# Patient Record
Sex: MALE | Race: BLACK OR AFRICAN AMERICAN | NOT HISPANIC OR LATINO | URBAN - METROPOLITAN AREA
[De-identification: names, ages, dates, MRNs, and addresses within clinical notes are randomized per-mention and may not be internally consistent; named-entity substitution may affect disease eponyms.]

---

## 2024-01-15 ENCOUNTER — OFFICE VISIT (OUTPATIENT)
Dept: PRIMARY CARE | Facility: CLINIC | Age: 34
End: 2024-01-15
Payer: COMMERCIAL

## 2024-01-15 ENCOUNTER — ANCILLARY PROCEDURE (OUTPATIENT)
Dept: RADIOLOGY | Facility: CLINIC | Age: 34
End: 2024-01-15
Payer: COMMERCIAL

## 2024-01-15 VITALS
TEMPERATURE: 98.3 F | SYSTOLIC BLOOD PRESSURE: 120 MMHG | OXYGEN SATURATION: 97 % | DIASTOLIC BLOOD PRESSURE: 78 MMHG | HEART RATE: 92 BPM

## 2024-01-15 DIAGNOSIS — R05.2 SUBACUTE COUGH: ICD-10-CM

## 2024-01-15 DIAGNOSIS — R06.02 SHORTNESS OF BREATH: ICD-10-CM

## 2024-01-15 DIAGNOSIS — R05.2 SUBACUTE COUGH: Primary | ICD-10-CM

## 2024-01-15 DIAGNOSIS — J06.9 ACUTE URI: ICD-10-CM

## 2024-01-15 PROCEDURE — 99214 OFFICE O/P EST MOD 30 MIN: CPT | Performed by: STUDENT IN AN ORGANIZED HEALTH CARE EDUCATION/TRAINING PROGRAM

## 2024-01-15 PROCEDURE — 1036F TOBACCO NON-USER: CPT | Performed by: STUDENT IN AN ORGANIZED HEALTH CARE EDUCATION/TRAINING PROGRAM

## 2024-01-15 PROCEDURE — 71046 X-RAY EXAM CHEST 2 VIEWS: CPT

## 2024-01-15 PROCEDURE — 71046 X-RAY EXAM CHEST 2 VIEWS: CPT | Performed by: RADIOLOGY

## 2024-01-15 RX ORDER — METHYLPREDNISOLONE 4 MG/1
4 TABLET ORAL
COMMUNITY
Start: 2024-01-06 | End: 2024-01-15 | Stop reason: ALTCHOICE

## 2024-01-15 RX ORDER — AZITHROMYCIN 250 MG/1
TABLET, FILM COATED ORAL
Qty: 6 TABLET | Refills: 0 | Status: SHIPPED | OUTPATIENT
Start: 2024-01-15

## 2024-01-15 RX ORDER — ALBUTEROL SULFATE 90 UG/1
2 AEROSOL, METERED RESPIRATORY (INHALATION) EVERY 4 HOURS PRN
Qty: 18 G | Refills: 1 | Status: SHIPPED | OUTPATIENT
Start: 2024-01-15 | End: 2025-01-14

## 2024-01-15 RX ORDER — HYDROXYZINE PAMOATE 25 MG/1
CAPSULE ORAL
COMMUNITY
Start: 2023-12-08

## 2024-01-15 RX ORDER — METHYLPREDNISOLONE 4 MG/1
TABLET ORAL
Qty: 21 TABLET | Refills: 0 | Status: SHIPPED | OUTPATIENT
Start: 2024-01-15 | End: 2024-01-22

## 2024-01-15 RX ORDER — BENZONATATE 200 MG/1
200 CAPSULE ORAL 3 TIMES DAILY PRN
Qty: 42 CAPSULE | Refills: 1 | Status: SHIPPED | OUTPATIENT
Start: 2024-01-15 | End: 2024-07-13

## 2024-01-15 RX ORDER — ALBUTEROL SULFATE 0.83 MG/ML
2.5 SOLUTION RESPIRATORY (INHALATION) EVERY 4 HOURS PRN
Qty: 3 ML | Refills: 1 | Status: SHIPPED | OUTPATIENT
Start: 2024-01-15 | End: 2024-01-15

## 2024-01-15 RX ORDER — AMOXICILLIN AND CLAVULANATE POTASSIUM 875; 125 MG/1; MG/1
1 TABLET, FILM COATED ORAL 2 TIMES DAILY
COMMUNITY

## 2024-01-15 ASSESSMENT — PATIENT HEALTH QUESTIONNAIRE - PHQ9
1. LITTLE INTEREST OR PLEASURE IN DOING THINGS: NOT AT ALL
SUM OF ALL RESPONSES TO PHQ9 QUESTIONS 1 AND 2: 0
2. FEELING DOWN, DEPRESSED OR HOPELESS: NOT AT ALL

## 2024-01-15 NOTE — PROGRESS NOTES
Subjective   Patient ID: Benjy Ly is a 33 y.o. male who presents for Cough.    HPI     Patient was originally sick mid December, where he had a sore throat with congestion.  He then improved slightly, but got another sore throat, on and off elevated temperatures, congestion and cough.  At that time, patient went to a [facility] on 1/6/2024 presenting with cough and sinus drainage, and lots of congestion.  He was given a Medrol Dosepak and Augmentin for 7 days.  Tested negative for RSV/COVID/flu.    Today patient is still complaining of cough states his other symptoms have resolved but he is still having a cough and shortness of breath when working out.  Does admit to having asthma as a kid.  Patient states he is worried of developing a pneumonia.  Patient has been trying over-the-counter Mucinex for chest congestion which has been helping slightly.  At this time patient denies any fevers, chills, ear pain, sinus pressure but admits to shortness of breath and cough.      Review of Systems  See above    Objective   /78 (BP Location: Left arm, Patient Position: Sitting, BP Cuff Size: Adult)   Pulse 92   Temp 36.8 °C (98.3 °F)   SpO2 97%     Physical Exam  Constitutional:       General: He is not in acute distress.     Appearance: Normal appearance. He is normal weight. He is not ill-appearing.   HENT:      Head: Normocephalic and atraumatic.      Right Ear: External ear normal.      Left Ear: External ear normal.      Nose: Nose normal.      Mouth/Throat:      Mouth: Mucous membranes are moist.      Pharynx: Oropharynx is clear.   Eyes:      Extraocular Movements: Extraocular movements intact.      Conjunctiva/sclera: Conjunctivae normal.   Cardiovascular:      Rate and Rhythm: Normal rate and regular rhythm.      Heart sounds: Normal heart sounds.   Pulmonary:      Breath sounds: Wheezing, rhonchi and rales present.   Skin:     General: Skin is warm.   Neurological:       General: No focal deficit present.      Mental Status: He is alert.   Psychiatric:         Mood and Affect: Mood normal.         Assessment/Plan     Discussed treatment options and course of illness with patient.  Patient is a formal  Rashawn, who is generally healthy.  An order for a Medrol Dosepak and azithromycin have been placed for you to cover for any atypical bacteria coverage that the Augmentin did not cover.  In addition Tessalon Perles have been ordered for your cough.  I will also order albuterol to help you with your reactive airway, as well as exercise induced coughing.  I will also order a chest x-ray, for you to get done at your earliest convenience to rule out any pneumonias.    Risks, benefits, and options of treatment(s) were discussed after reviewing all current medication(s) and drug allergy(ies)    I opted for the treatment that we discussed with instructions on the medication use for your underlying medical ailment(s)    I encouraged supportive care such as rest, fluids and Advil/Tylenol as warranted    Return to the clinic in 7-10 days or sooner if symptoms worsen or persist as we will then further evaluate

## 2024-01-16 NOTE — RESULT ENCOUNTER NOTE
Chest x-ray within normal limits    No pneumonia is noted    I would recommend continuing the therapy as discussed yesterday

## 2024-08-27 ENCOUNTER — LAB (OUTPATIENT)
Dept: LAB | Facility: LAB | Age: 34
End: 2024-08-27
Payer: COMMERCIAL

## 2024-08-27 ENCOUNTER — APPOINTMENT (OUTPATIENT)
Dept: PRIMARY CARE | Facility: CLINIC | Age: 34
End: 2024-08-27
Payer: COMMERCIAL

## 2024-08-27 VITALS
HEART RATE: 70 BPM | WEIGHT: 190 LBS | DIASTOLIC BLOOD PRESSURE: 72 MMHG | SYSTOLIC BLOOD PRESSURE: 112 MMHG | BODY MASS INDEX: 29.82 KG/M2 | HEIGHT: 67 IN

## 2024-08-27 DIAGNOSIS — R53.83 OTHER FATIGUE: ICD-10-CM

## 2024-08-27 DIAGNOSIS — Z00.00 HEALTHCARE MAINTENANCE: Primary | ICD-10-CM

## 2024-08-27 DIAGNOSIS — Z11.1 SCREENING-PULMONARY TB: ICD-10-CM

## 2024-08-27 DIAGNOSIS — Z00.00 HEALTHCARE MAINTENANCE: ICD-10-CM

## 2024-08-27 LAB
25(OH)D3 SERPL-MCNC: 19 NG/ML (ref 30–100)
ALBUMIN SERPL BCP-MCNC: 4.6 G/DL (ref 3.4–5)
ALP SERPL-CCNC: 50 U/L (ref 33–120)
ALT SERPL W P-5'-P-CCNC: 21 U/L (ref 10–52)
ANION GAP SERPL CALC-SCNC: 12 MMOL/L (ref 10–20)
AST SERPL W P-5'-P-CCNC: 23 U/L (ref 9–39)
BASOPHILS # BLD AUTO: 0.06 X10*3/UL (ref 0–0.1)
BASOPHILS NFR BLD AUTO: 1.6 %
BILIRUB SERPL-MCNC: 0.7 MG/DL (ref 0–1.2)
BUN SERPL-MCNC: 12 MG/DL (ref 6–23)
CALCIUM SERPL-MCNC: 10.1 MG/DL (ref 8.6–10.3)
CHLORIDE SERPL-SCNC: 104 MMOL/L (ref 98–107)
CHOLEST SERPL-MCNC: 242 MG/DL (ref 0–199)
CHOLESTEROL/HDL RATIO: 5.1
CO2 SERPL-SCNC: 25 MMOL/L (ref 21–32)
CREAT SERPL-MCNC: 1.15 MG/DL (ref 0.5–1.3)
EGFRCR SERPLBLD CKD-EPI 2021: 85 ML/MIN/1.73M*2
EOSINOPHIL # BLD AUTO: 0.19 X10*3/UL (ref 0–0.7)
EOSINOPHIL NFR BLD AUTO: 5.1 %
ERYTHROCYTE [DISTWIDTH] IN BLOOD BY AUTOMATED COUNT: 13.8 % (ref 11.5–14.5)
GLUCOSE SERPL-MCNC: 101 MG/DL (ref 74–99)
HCT VFR BLD AUTO: 44.9 % (ref 41–52)
HCV AB SER QL: NONREACTIVE
HDLC SERPL-MCNC: 47 MG/DL
HGB BLD-MCNC: 15.4 G/DL (ref 13.5–17.5)
HIV 1+2 AB+HIV1 P24 AG SERPL QL IA: NONREACTIVE
IMM GRANULOCYTES # BLD AUTO: 0.02 X10*3/UL (ref 0–0.7)
IMM GRANULOCYTES NFR BLD AUTO: 0.5 % (ref 0–0.9)
LDLC SERPL CALC-MCNC: 165 MG/DL
LYMPHOCYTES # BLD AUTO: 1.64 X10*3/UL (ref 1.2–4.8)
LYMPHOCYTES NFR BLD AUTO: 43.6 %
MCH RBC QN AUTO: 31 PG (ref 26–34)
MCHC RBC AUTO-ENTMCNC: 34.3 G/DL (ref 32–36)
MCV RBC AUTO: 90 FL (ref 80–100)
MONOCYTES # BLD AUTO: 0.35 X10*3/UL (ref 0.1–1)
MONOCYTES NFR BLD AUTO: 9.3 %
NEUTROPHILS # BLD AUTO: 1.5 X10*3/UL (ref 1.2–7.7)
NEUTROPHILS NFR BLD AUTO: 39.9 %
NON HDL CHOLESTEROL: 195 MG/DL (ref 0–149)
NRBC BLD-RTO: 0.5 /100 WBCS (ref 0–0)
PLATELET # BLD AUTO: 223 X10*3/UL (ref 150–450)
POC APPEARANCE, URINE: CLEAR
POC BILIRUBIN, URINE: NEGATIVE
POC BLOOD, URINE: NEGATIVE
POC COLOR, URINE: YELLOW
POC GLUCOSE, URINE: NEGATIVE MG/DL
POC KETONES, URINE: NEGATIVE MG/DL
POC LEUKOCYTES, URINE: NEGATIVE
POC NITRITE,URINE: NEGATIVE
POC PH, URINE: 7 PH
POC PROTEIN, URINE: NEGATIVE MG/DL
POC SPECIFIC GRAVITY, URINE: 1.01
POC UROBILINOGEN, URINE: 1 EU/DL
POTASSIUM SERPL-SCNC: 4 MMOL/L (ref 3.5–5.3)
PROT SERPL-MCNC: 7.6 G/DL (ref 6.4–8.2)
RBC # BLD AUTO: 4.97 X10*6/UL (ref 4.5–5.9)
SODIUM SERPL-SCNC: 137 MMOL/L (ref 136–145)
TRIGL SERPL-MCNC: 148 MG/DL (ref 0–149)
TSH SERPL-ACNC: 0.99 MIU/L (ref 0.44–3.98)
VIT B12 SERPL-MCNC: 368 PG/ML (ref 211–911)
VLDL: 30 MG/DL (ref 0–40)
WBC # BLD AUTO: 3.8 X10*3/UL (ref 4.4–11.3)

## 2024-08-27 PROCEDURE — 86481 TB AG RESPONSE T-CELL SUSP: CPT

## 2024-08-27 PROCEDURE — 83036 HEMOGLOBIN GLYCOSYLATED A1C: CPT

## 2024-08-27 PROCEDURE — 36415 COLL VENOUS BLD VENIPUNCTURE: CPT

## 2024-08-27 PROCEDURE — 84443 ASSAY THYROID STIM HORMONE: CPT

## 2024-08-27 PROCEDURE — 85025 COMPLETE CBC W/AUTO DIFF WBC: CPT

## 2024-08-27 PROCEDURE — 93000 ELECTROCARDIOGRAM COMPLETE: CPT | Performed by: STUDENT IN AN ORGANIZED HEALTH CARE EDUCATION/TRAINING PROGRAM

## 2024-08-27 PROCEDURE — 82306 VITAMIN D 25 HYDROXY: CPT

## 2024-08-27 PROCEDURE — 80053 COMPREHEN METABOLIC PANEL: CPT

## 2024-08-27 PROCEDURE — 3008F BODY MASS INDEX DOCD: CPT | Performed by: STUDENT IN AN ORGANIZED HEALTH CARE EDUCATION/TRAINING PROGRAM

## 2024-08-27 PROCEDURE — 81002 URINALYSIS NONAUTO W/O SCOPE: CPT | Performed by: STUDENT IN AN ORGANIZED HEALTH CARE EDUCATION/TRAINING PROGRAM

## 2024-08-27 PROCEDURE — 87389 HIV-1 AG W/HIV-1&-2 AB AG IA: CPT

## 2024-08-27 PROCEDURE — 86803 HEPATITIS C AB TEST: CPT

## 2024-08-27 PROCEDURE — 82607 VITAMIN B-12: CPT

## 2024-08-27 PROCEDURE — 99395 PREV VISIT EST AGE 18-39: CPT | Performed by: STUDENT IN AN ORGANIZED HEALTH CARE EDUCATION/TRAINING PROGRAM

## 2024-08-27 PROCEDURE — 80061 LIPID PANEL: CPT

## 2024-08-27 NOTE — PROGRESS NOTES
Subjective   Patient ID: Benjy Ly is a 34 y.o. male who presents for Annual Exam.  Today he is accompanied by alone.     Currently is a  Rashawn coming into the office for his yearly physical examination    HPI  1.  Healthcare maintenance  Overall patient is doing well.   Immunization: Tdap 2019  Last influenza vaccine was 2022  COVID-19 vaccine up-to-date x 3  Colon Cancer Screening: No direct family history but possibly on his mother side  Diet: Attempts to eat a well-balanced diet  Exercise: Exercises regularly  Tobacco: Denies use  EtOH: Rarely/socially  He is fasting in preparation of lab work performed  Denies any chest pain, palpitations, shortness of breath, cough, nausea, vomiting, diarrhea, or any other acute signs/symptoms      Current Outpatient Medications on File Prior to Visit   Medication Sig Dispense Refill    [DISCONTINUED] albuterol (ProAir HFA) 90 mcg/actuation inhaler Inhale 2 puffs every 4 hours if needed for wheezing or shortness of breath. 18 g 1    [DISCONTINUED] amoxicillin-pot clavulanate (Augmentin) 875-125 mg tablet Take 1 tablet (875 mg) by mouth 2 times a day.      [DISCONTINUED] azithromycin (Zithromax Z-Jensen) 250 mg tablet TAKE 2 TABLETS ON DAY 1 THEN TAKE 1 TABLET A DAY FOR 4 DAYS 6 tablet 0    [DISCONTINUED] hydrOXYzine pamoate (Vistaril) 25 mg capsule TAKE 1 CAPSULE BY MOUTH EVERY 6 HOURS AS NEEDED FOR UP TO 7 DAYS.       No current facility-administered medications on file prior to visit.        No Known Allergies    Immunization History   Administered Date(s) Administered    Flu vaccine (IIV4), preservative free *Check age/dose* 10/29/2020, 12/12/2022    Hepatitis B vaccine, 19 yrs and under (RECOMBIVAX, ENGERIX) 03/19/1998, 11/18/1998    Hepatitis B vaccine, adult *Check Product/Dose* 08/19/2009, 09/17/2009, 03/19/2010    MMR vaccine, subcutaneous (MMR II) 11/18/1998    Pfizer Purple Cap SARS-CoV-2 03/27/2021, 04/17/2021, 11/21/2021    Td vaccine, age 7 years and  "older (TDVAX) 08/19/2009         Review of Systems  All pertinent positive symptoms are included in the history of present illness.  All other systems have been reviewed and are negative and noncontributory to this patient's current ailments.     Objective   /72   Pulse 70   Ht 1.702 m (5' 7\")   Wt 86.2 kg (190 lb)   BMI 29.76 kg/m²   BSA: 2.02 meters squared      Physical Exam  CONSTITUTIONAL - well nourished, well developed, looks like stated age, in no acute distress, not ill-appearing, and not tired appearing  SKIN - normal skin color and pigmentation, normal skin turgor without rash, lesions, or nodules visualized  HEAD - no trauma, normocephalic  EYES - normal external exam  ENT - TM's intact, no injection, no signs of infection, uvula midline, normal tongue movement and throat normal, no exudate  NECK - supple without rigidity, no neck mass was observed, no thyromegaly or thyroid nodules  CHEST - clear to auscultation, no wheezing, no crackles and no rales, good effort  CARDIAC - regular rate and regular rhythm, no skipped beats, no murmur  ABDOMEN - no organomegaly, soft, nontender, nondistended, normal bowel sounds, no guarding/rebound/rigidity, negative McBurney sign and negative Malhotra sign  EXTREMITIES - no edema, no deformities  NEUROLOGICAL - normal gait, normal balance, normal motor, no ataxia  PSYCHIATRIC - alert, pleasant and cordial, age-appropriate  IMMUNOLOGIC - no cervical lymphadenopathy     Assessment/Plan   1.  Healthcare maintenance  Complete history and physical examination was performed  EKG reveals normal sinus rhythm without acute changes  We will notify of test results once available and make treatment recommendations accordingly  Please times eat a well-balanced diet and exercise regularly  " No

## 2024-08-28 LAB
EST. AVERAGE GLUCOSE BLD GHB EST-MCNC: 134 MG/DL
HBA1C MFR BLD: 6.3 %

## 2024-08-28 NOTE — RESULT ENCOUNTER NOTE
Surprisingly hemoglobin A1c shows prediabetes at 6.3%    Diabetes is at 6.5% so I would truly recommend diet and exercise at this time and limiting his carbohydrates and we will continue monitoring yearly

## 2024-08-28 NOTE — RESULT ENCOUNTER NOTE
Vitamin D low at 19 -  recommend daily supplementation    Cholesterol is elevated at 242, HDL 47, , triglycerides 148  I recommend diet and exercise at this time    Sugar 1 point above normal but otherwise liver, kidneys, electrolytes within normal limits    Complete blood cell count shows a slightly low white blood cell count at 3.8 but the differential is within normal limits I am not concerned and this has been stable over time    Hepatitis C and HIV are both negative    B12 within normal limits alongside a normal thyroid    We are just waiting for the hemoglobin A1c and the T spot

## 2024-08-29 LAB
NIL(NEG) CONTROL SPOT COUNT: NORMAL
PANEL A SPOT COUNT: 0
PANEL B SPOT COUNT: 0
POS CONTROL SPOT COUNT: NORMAL
T-SPOT. TB INTERPRETATION: NEGATIVE

## 2024-12-11 ENCOUNTER — TELEMEDICINE (OUTPATIENT)
Dept: PRIMARY CARE | Facility: CLINIC | Age: 34
End: 2024-12-11
Payer: COMMERCIAL

## 2024-12-11 DIAGNOSIS — Z20.828 EXPOSURE TO INFLUENZA: ICD-10-CM

## 2024-12-11 DIAGNOSIS — J10.1 INFLUENZA A: Primary | ICD-10-CM

## 2024-12-11 PROCEDURE — 1036F TOBACCO NON-USER: CPT | Performed by: STUDENT IN AN ORGANIZED HEALTH CARE EDUCATION/TRAINING PROGRAM

## 2024-12-11 PROCEDURE — 99214 OFFICE O/P EST MOD 30 MIN: CPT | Performed by: STUDENT IN AN ORGANIZED HEALTH CARE EDUCATION/TRAINING PROGRAM

## 2024-12-11 RX ORDER — OSELTAMIVIR PHOSPHATE 75 MG/1
75 CAPSULE ORAL 2 TIMES DAILY
Qty: 10 CAPSULE | Refills: 0 | Status: SHIPPED | OUTPATIENT
Start: 2024-12-11 | End: 2024-12-16

## 2024-12-11 ASSESSMENT — ENCOUNTER SYMPTOMS
FEVER: 1
COUGH: 1

## 2024-12-11 NOTE — PROGRESS NOTES
Subjective   Patient ID: Benjy Ly is a 34 y.o. male who presents for Fever.  Today he is accompanied by alone.     Fever   This is a new problem. The current episode started yesterday. The problem occurs intermittently. The problem has been unchanged. Associated symptoms include coughing.   Risk factors: hx of cancer and sick contacts    Risk factors: no contaminated food, no contaminated water, no immunosuppression, no occupational exposure, no recent sickness and no recent travel      1. Influenza A/ Exposure to Influenza   Presents virtually today due to fever and chills since last night   The fever and chills are accompanied by cough, headache, congestion, post-nasal drip and fatigue  He was sick a few weeks prior, but these symptoms have since resolved   Has taken ibuprofen and Charissa-seltzer nighttime, without relief   Did not take an at home COVID test   His son was recently diagnosed with the influenza A on 12/8 at urgent care, where he was treated with amoxicillin and Tamiflu   He has been in close contact with his son, including his son jumping into his bed     No current outpatient medications on file prior to visit.     No current facility-administered medications on file prior to visit.        No Known Allergies    Immunization History   Administered Date(s) Administered    COVID-19, mRNA, LNP-S, PF, 30 mcg/0.3 mL dose 03/27/2021, 04/17/2021, 11/21/2021    Flu vaccine (IIV4), preservative free *Check age/dose* 10/29/2020, 12/12/2022    Hepatitis B vaccine, 19 yrs and under (RECOMBIVAX, ENGERIX) 03/19/1998, 11/18/1998    Hepatitis B vaccine, adult *Check Product/Dose* 08/19/2009, 09/17/2009, 03/19/2010    MMR vaccine, subcutaneous (MMR II) 11/18/1998    Td vaccine, age 7 years and older (TDVAX) 08/19/2009         Review of Systems   Constitutional:  Positive for fever.   Respiratory:  Positive for cough.      All pertinent positive symptoms are included in the history of present illness.  All  other systems have been reviewed and are negative and noncontributory to this patient's current ailments.     Objective   There were no vitals taken for this visit.  BSA: There is no height or weight on file to calculate BSA.  No visits with results within 1 Month(s) from this visit.   Latest known visit with results is:   Lab on 08/27/2024   Component Date Value Ref Range Status    Thyroid Stimulating Hormone 08/27/2024 0.99  0.44 - 3.98 mIU/L Final    Cholesterol 08/27/2024 242 (H)  0 - 199 mg/dL Final          Age      Desirable   Borderline High   High     0-19 Y     0 - 169       170 - 199     >/= 200    20-24 Y     0 - 189       190 - 224     >/= 225         >24 Y     0 - 199       200 - 239     >/= 240   **All ranges are based on fasting samples. Specific   therapeutic targets will vary based on patient-specific   cardiac risk.    Pediatric guidelines reference:Pediatrics 2011, 128(S5).Adult guidelines reference: NCEP ATPIII Guidelines,LINN 2001, 258:2486-97    Venipuncture immediately after or during the administration of Metamizole may lead to falsely low results. Testing should be performed immediately prior to Metamizole dosing.    HDL-Cholesterol 08/27/2024 47.0  mg/dL Final      Age       Very Low   Low     Normal    High    0-19 Y    < 35      < 40     40-45     ----  20-24 Y    ----     < 40      >45      ----        >24 Y      ----     < 40     40-60      >60      Cholesterol/HDL Ratio 08/27/2024 5.1   Final      Ref Values  Desirable  < 3.4  High Risk  > 5.0    LDL Calculated 08/27/2024 165 (H)  <=99 mg/dL Final                                Near   Borderline      AGE      Desirable  Optimal    High     High     Very High     0-19 Y     0 - 109     ---    110-129   >/= 130     ----    20-24 Y     0 - 119     ---    120-159   >/= 160     ----      >24 Y     0 -  99   100-129  130-159   160-189     >/=190      VLDL 08/27/2024 30  0 - 40 mg/dL Final    Triglycerides 08/27/2024 148  0 - 149 mg/dL Final        Age         Desirable   Borderline High   High     Very High   0 D-90 D    19 - 174         ----         ----        ----  91 D- 9 Y     0 -  74        75 -  99     >/= 100      ----    10-19 Y     0 -  89        90 - 129     >/= 130      ----    20-24 Y     0 - 114       115 - 149     >/= 150      ----         >24 Y     0 - 149       150 - 199    200- 499    >/= 500    Venipuncture immediately after or during the administration of Metamizole may lead to falsely low results. Testing should be performed immediately prior to Metamizole dosing.    Non HDL Cholesterol 08/27/2024 195 (H)  0 - 149 mg/dL Final          Age       Desirable   Borderline High   High     Very High     0-19 Y     0 - 119       120 - 144     >/= 145    >/= 160    20-24 Y     0 - 149       150 - 189     >/= 190      ----         >24 Y    30 mg/dL above LDL Cholesterol goal      Glucose 08/27/2024 101 (H)  74 - 99 mg/dL Final    Sodium 08/27/2024 137  136 - 145 mmol/L Final    Potassium 08/27/2024 4.0  3.5 - 5.3 mmol/L Final    Chloride 08/27/2024 104  98 - 107 mmol/L Final    Bicarbonate 08/27/2024 25  21 - 32 mmol/L Final    Anion Gap 08/27/2024 12  10 - 20 mmol/L Final    Urea Nitrogen 08/27/2024 12  6 - 23 mg/dL Final    Creatinine 08/27/2024 1.15  0.50 - 1.30 mg/dL Final    eGFR 08/27/2024 85  >60 mL/min/1.73m*2 Final    Calculations of estimated GFR are performed using the 2021 CKD-EPI Study Refit equation without the race variable for the IDMS-Traceable creatinine methods.  https://jasn.asnjournals.org/content/early/2021/09/22/ASN.7455718103    Calcium 08/27/2024 10.1  8.6 - 10.3 mg/dL Final    Albumin 08/27/2024 4.6  3.4 - 5.0 g/dL Final    Alkaline Phosphatase 08/27/2024 50  33 - 120 U/L Final    Total Protein 08/27/2024 7.6  6.4 - 8.2 g/dL Final    AST 08/27/2024 23  9 - 39 U/L Final    Bilirubin, Total 08/27/2024 0.7  0.0 - 1.2 mg/dL Final    ALT 08/27/2024 21  10 - 52 U/L Final    Patients treated with Sulfasalazine may generate  falsely decreased results for ALT.    WBC 08/27/2024 3.8 (L)  4.4 - 11.3 x10*3/uL Final    nRBC 08/27/2024 0.5 (H)  0.0 - 0.0 /100 WBCs Final    RBC 08/27/2024 4.97  4.50 - 5.90 x10*6/uL Final    Hemoglobin 08/27/2024 15.4  13.5 - 17.5 g/dL Final    Hematocrit 08/27/2024 44.9  41.0 - 52.0 % Final    MCV 08/27/2024 90  80 - 100 fL Final    MCH 08/27/2024 31.0  26.0 - 34.0 pg Final    MCHC 08/27/2024 34.3  32.0 - 36.0 g/dL Final    RDW 08/27/2024 13.8  11.5 - 14.5 % Final    Platelets 08/27/2024 223  150 - 450 x10*3/uL Final    Neutrophils % 08/27/2024 39.9  40.0 - 80.0 % Final    Immature Granulocytes %, Automated 08/27/2024 0.5  0.0 - 0.9 % Final    Immature Granulocyte Count (IG) includes promyelocytes, myelocytes and metamyelocytes but does not include bands. Percent differential counts (%) should be interpreted in the context of the absolute cell counts (cells/UL).    Lymphocytes % 08/27/2024 43.6  13.0 - 44.0 % Final    Monocytes % 08/27/2024 9.3  2.0 - 10.0 % Final    Eosinophils % 08/27/2024 5.1  0.0 - 6.0 % Final    Basophils % 08/27/2024 1.6  0.0 - 2.0 % Final    Neutrophils Absolute 08/27/2024 1.50  1.20 - 7.70 x10*3/uL Final    Percent differential counts (%) should be interpreted in the context of the absolute cell counts (cells/uL).    Immature Granulocytes Absolute, Au* 08/27/2024 0.02  0.00 - 0.70 x10*3/uL Final    Lymphocytes Absolute 08/27/2024 1.64  1.20 - 4.80 x10*3/uL Final    Monocytes Absolute 08/27/2024 0.35  0.10 - 1.00 x10*3/uL Final    Eosinophils Absolute 08/27/2024 0.19  0.00 - 0.70 x10*3/uL Final    Basophils Absolute 08/27/2024 0.06  0.00 - 0.10 x10*3/uL Final    Hemoglobin A1C 08/27/2024 6.3 (H)  see below % Final    Estimated Average Glucose 08/27/2024 134  Not Established mg/dL Final    HIV 1/2 Antigen/Antibody Screen wi* 08/27/2024 Nonreactive  Nonreactive Final    Hepatitis C AB 08/27/2024 Nonreactive  Nonreactive Final    Results from patients taking biotin supplements or receiving  high-dose biotin therapy should be interpreted with caution due to possible interference with this test. Providers may contact their local laboratory for further information.    T-SPOT. TB Interpretation 08/27/2024 Negative  Negative Final       A negative test result does not exclude the possibility  of exposure to or infection with Mycobacterium  tuberculosis (M. tuberculosis). Patients with recent  exposure to TB infected individuals exhibiting a  negative T-SPOT.TB result should be considered for  retesting within 6 weeks or if other relevant clinical  symptoms indicate. Results from T-SPOT.TB testing must  be used in conjunction with each individual's  epidemiological history, current medical status,  and results of other diagnostic evaluations.            The T-SPOT.TB test is qualitative and results are  reported as positive, borderline, or negative, given  that the test controls perform as expected. In line  with the Centers for Disease Control and Prevention's  2010 recommendation to report quantitative measurements  alongside the qualitative result, the laboratory  provides spot counts for informational purposes only.  The T-SPOT.TB test should not be interpreted as a  quantitative test.       Panel A Spot Count 08/27/2024 0   Final    Panel B Spot Count 08/27/2024 0   Final    NIL(NEG) Control Spot Count 08/27/2024 Passed   Final    POS Control Spot Count 08/27/2024 Passed   Final       For additional information, please refer to  [email or web address]/faq/JJD240     (This link is being provided for informational/  educational purposes only.)           Vitamin B12 08/27/2024 368  211 - 911 pg/mL Final    Vitamin D, 25-Hydroxy, Total 08/27/2024 19 (L)  30 - 100 ng/mL Final       Physical Exam  CONSTITUTIONAL - well nourished, well developed, looks like stated age, in no acute distress, appears slightly ill and fatigued  SKIN - normal skin color and pigmentation, normal skin turgor without  "rash, lesions, or nodules visualized  HEAD - no trauma, normocephalic  EYES - normal external exam  CHEST -no distressed breathing, good effort, coughing during examination  EXTREMITIES - no edema, no deformities  NEUROLOGICAL - normal balance, normal motor, no ataxia  PSYCHIATRIC - alert, pleasant and cordial, age-appropriate    Assessment/Plan   1. Influenza A/ Exposure to Influenza   You are most likely positive for influenza \"A\" due to your close exposure to your son who tested positive just a few days ago    Risks, benefits, and options of treatment(s) were discussed and we opted for the Tamiflu treatment as discussed with instructions on the medication use for underlying influenza    I encouraged supportive care such as rest, fluids and Advil/Tylenol as warranted    It is recommended that you avoid exposure to others (work and/or school) until fever and/or symptoms have dissipated without the use of an antipyretic(s) for at least 24 hours    If there is no significant improvement within the next 2 to 3 days, then further evaluation must be considered  At any point if you notice shortness of breath or worsening symptoms please let me know immediately and/or go to nearest emergency room to be acutely evaluate      "

## 2025-07-13 ENCOUNTER — OFFICE VISIT (OUTPATIENT)
Dept: URGENT CARE | Age: 35
End: 2025-07-13
Payer: COMMERCIAL

## 2025-07-13 VITALS
OXYGEN SATURATION: 98 % | WEIGHT: 190 LBS | HEART RATE: 82 BPM | SYSTOLIC BLOOD PRESSURE: 114 MMHG | TEMPERATURE: 98 F | RESPIRATION RATE: 18 BRPM | BODY MASS INDEX: 29.76 KG/M2 | DIASTOLIC BLOOD PRESSURE: 83 MMHG

## 2025-07-13 DIAGNOSIS — J01.41 ACUTE RECURRENT PANSINUSITIS: ICD-10-CM

## 2025-07-13 DIAGNOSIS — R52 PAIN: Primary | ICD-10-CM

## 2025-07-13 PROBLEM — D72.819 LEUKOPENIA: Status: ACTIVE | Noted: 2025-07-13

## 2025-07-13 LAB
POC CORONAVIRUS SARS-COV-2 PCR: NEGATIVE
POC HUMAN RHINOVIRUS PCR: NEGATIVE
POC INFLUENZA A VIRUS PCR: NEGATIVE
POC INFLUENZA B VIRUS PCR: NEGATIVE
POC RESPIRATORY SYNCYTIAL VIRUS PCR: NEGATIVE

## 2025-07-13 RX ORDER — AZELASTINE 1 MG/ML
1 SPRAY, METERED NASAL 2 TIMES DAILY
Qty: 30 ML | Refills: 0 | Status: SHIPPED | OUTPATIENT
Start: 2025-07-13 | End: 2025-07-20

## 2025-07-13 RX ORDER — AMOXICILLIN AND CLAVULANATE POTASSIUM 875; 125 MG/1; MG/1
875 TABLET, FILM COATED ORAL 2 TIMES DAILY
Qty: 20 TABLET | Refills: 0 | Status: SHIPPED | OUTPATIENT
Start: 2025-07-13 | End: 2025-07-23

## 2025-07-13 ASSESSMENT — ENCOUNTER SYMPTOMS: SINUS COMPLAINT: 1

## 2025-07-13 NOTE — PATIENT INSTRUCTIONS
Take Probiotics and or eat yogurt for 2 weeks   After your sinus infection improved star using Steroid nasal sprays daily such as   Mometasone furoate, Triamcinolone Acetonide or Flonase .

## 2025-07-13 NOTE — PROGRESS NOTES
Subjective   Patient ID: Benjy Ly is a 35 y.o. male. They present today with a chief complaint of Sinus Problem (Sinus drainage into throat with pressure in forehead symptoms started about 3 day ago. ).    History of Present Illness    Sinus Problem      Past Medical History  Allergies as of 07/13/2025    (No Known Allergies)       Prescriptions Prior to Admission[1]     Medical History[2]    Surgical History[3]     reports that he has never smoked. He has never used smokeless tobacco.    Review of Systems  Review of Systems                               Objective    Vitals:    07/13/25 0954   BP: 114/83   Pulse: 82   Resp: 18   Temp: 36.7 °C (98 °F)   SpO2: 98%   Weight: 86.2 kg (190 lb)     No LMP for male patient.    Physical Exam    Procedures    Point of Care Test & Imaging Results from this visit  No results found for this visit on 07/13/25.   Imaging  No results found.    Cardiology, Vascular, and Other Imaging  No other imaging results found for the past 2 days      Diagnostic study results (if any) were reviewed by Sera Mcclellan MD.    Assessment/Plan   Allergies, medications, history, and pertinent labs/EKGs/Imaging reviewed by Sera Mcclellan MD.     Medical Decision Making   Recurrent acute sinusitis, will be treated with Augmentin and Azelastine.    Orders and Diagnoses  Diagnoses and all orders for this visit:  Pain      Medical Admin Record      Patient disposition: Home    Electronically signed by Sera Mcclellan MD  10:07 AM           [1] (Not in a hospital admission)  [2] History reviewed. No pertinent past medical history.  [3] History reviewed. No pertinent surgical history.